# Patient Record
Sex: FEMALE | Race: WHITE | NOT HISPANIC OR LATINO | Employment: FULL TIME | URBAN - METROPOLITAN AREA
[De-identification: names, ages, dates, MRNs, and addresses within clinical notes are randomized per-mention and may not be internally consistent; named-entity substitution may affect disease eponyms.]

---

## 2023-08-23 PROBLEM — H40.1190 PRIMARY OPEN ANGLE GLAUCOMA: Status: ACTIVE | Noted: 2023-08-23

## 2023-08-23 PROBLEM — H02.834 DERMATOCHALASIS OF LEFT UPPER EYELID: Status: ACTIVE | Noted: 2023-08-23

## 2023-08-23 PROBLEM — H02.831 DERMATOCHALASIS OF RIGHT UPPER EYELID: Status: ACTIVE | Noted: 2023-08-23

## 2023-08-23 PROBLEM — E88.09 HYPERPROTEINEMIA: Status: ACTIVE | Noted: 2023-08-23

## 2023-08-23 PROBLEM — F41.9 ANXIETY: Status: ACTIVE | Noted: 2023-08-23

## 2023-08-23 PROBLEM — D72.819 LEUKOPENIA: Status: ACTIVE | Noted: 2023-08-23

## 2023-08-23 PROBLEM — H25.10 NUCLEAR SENILE CATARACT: Status: ACTIVE | Noted: 2023-08-23

## 2023-08-23 PROBLEM — I10 ESSENTIAL HYPERTENSION: Status: ACTIVE | Noted: 2023-08-23

## 2023-08-23 PROBLEM — R68.89 SUSPECTED GLAUCOMA OF BOTH EYES: Status: ACTIVE | Noted: 2023-08-23

## 2023-08-23 PROBLEM — H40.059 OCULAR HYPERTENSION: Status: ACTIVE | Noted: 2023-08-23

## 2023-08-23 RX ORDER — AMLODIPINE BESYLATE 2.5 MG/1
1 TABLET ORAL DAILY
COMMUNITY
Start: 2021-05-07 | End: 2024-02-19 | Stop reason: SDUPTHER

## 2023-08-23 RX ORDER — LYSINE HCL 500 MG
1 TABLET ORAL DAILY
COMMUNITY
End: 2024-02-19 | Stop reason: SDUPTHER

## 2023-09-28 ENCOUNTER — HOSPITAL ENCOUNTER (OUTPATIENT)
Dept: DATA CONVERSION | Facility: HOSPITAL | Age: 63
End: 2023-09-28

## 2023-09-28 DIAGNOSIS — Z12.31 ENCOUNTER FOR SCREENING MAMMOGRAM FOR MALIGNANT NEOPLASM OF BREAST: ICD-10-CM

## 2023-09-30 ENCOUNTER — ANCILLARY PROCEDURE (OUTPATIENT)
Dept: RADIOLOGY | Facility: CLINIC | Age: 63
End: 2023-09-30
Payer: COMMERCIAL

## 2023-09-30 VITALS — WEIGHT: 151 LBS | HEIGHT: 65 IN | BODY MASS INDEX: 25.16 KG/M2

## 2023-09-30 DIAGNOSIS — Z12.9 SCREENING FOR MALIGNANT NEOPLASM: ICD-10-CM

## 2023-09-30 PROCEDURE — 77067 SCR MAMMO BI INCL CAD: CPT | Mod: 50

## 2023-09-30 PROCEDURE — 77063 BREAST TOMOSYNTHESIS BI: CPT | Mod: 50

## 2024-01-30 ENCOUNTER — OFFICE VISIT (OUTPATIENT)
Dept: OPHTHALMOLOGY | Facility: CLINIC | Age: 64
End: 2024-01-30
Payer: COMMERCIAL

## 2024-01-30 DIAGNOSIS — H04.123 DRY EYES, BILATERAL: ICD-10-CM

## 2024-01-30 DIAGNOSIS — H25.813 COMBINED FORMS OF AGE-RELATED CATARACT OF BOTH EYES: ICD-10-CM

## 2024-01-30 DIAGNOSIS — H40.053 BILATERAL OCULAR HYPERTENSION: ICD-10-CM

## 2024-01-30 DIAGNOSIS — H40.053 OCULAR HYPERTENSION, BILATERAL: ICD-10-CM

## 2024-01-30 DIAGNOSIS — R68.89 SUSPECTED GLAUCOMA OF BOTH EYES: ICD-10-CM

## 2024-01-30 DIAGNOSIS — H40.003 PREGLAUCOMA, BILATERAL: Primary | ICD-10-CM

## 2024-01-30 PROCEDURE — 99214 OFFICE O/P EST MOD 30 MIN: CPT | Performed by: OPHTHALMOLOGY

## 2024-01-30 PROCEDURE — 92083 EXTENDED VISUAL FIELD XM: CPT | Performed by: OPHTHALMOLOGY

## 2024-01-30 PROCEDURE — 92133 CPTRZD OPH DX IMG PST SGM ON: CPT | Performed by: OPHTHALMOLOGY

## 2024-01-30 ASSESSMENT — ENCOUNTER SYMPTOMS
PSYCHIATRIC NEGATIVE: 0
CARDIOVASCULAR NEGATIVE: 0
ALLERGIC/IMMUNOLOGIC NEGATIVE: 0
RESPIRATORY NEGATIVE: 0
CONSTITUTIONAL NEGATIVE: 0
NEUROLOGICAL NEGATIVE: 0
HEMATOLOGIC/LYMPHATIC NEGATIVE: 0
EYES NEGATIVE: 0
GASTROINTESTINAL NEGATIVE: 0
ENDOCRINE NEGATIVE: 0
DEPRESSION: 0
LOSS OF SENSATION IN FEET: 0
MUSCULOSKELETAL NEGATIVE: 0
OCCASIONAL FEELINGS OF UNSTEADINESS: 0

## 2024-01-30 ASSESSMENT — REFRACTION_WEARINGRX
OD_CYLINDER: -1.50
SPECS_TYPE: DISTANCE ONLY
OD_AXIS: 103
OS_AXIS: 083
OS_SPHERE: +1.75
OD_SPHERE: +1.75
SPECS_TYPE: OTC READERS
OS_SPHERE: +1.00
OS_CYLINDER: -1.50
OD_SPHERE: +1.00

## 2024-01-30 ASSESSMENT — VISUAL ACUITY
OS_SC+: -2
OD_SC: 20/30
OD_SC+: +1
OS_SC: 20/30
METHOD: SNELLEN - SINGLE

## 2024-01-30 ASSESSMENT — TONOMETRY
OD_IOP_MMHG: 20
OS_IOP_MMHG: 20
IOP_METHOD: GOLDMANN APPLANATION

## 2024-01-30 ASSESSMENT — EXTERNAL EXAM - RIGHT EYE: OD_EXAM: BROW PTOSIS

## 2024-01-30 ASSESSMENT — PAIN SCALES - GENERAL: PAINLEVEL: 0-NO PAIN

## 2024-01-30 ASSESSMENT — PACHYMETRY
OD_CT(UM): 615
OS_CT(UM): 613

## 2024-01-30 ASSESSMENT — PATIENT HEALTH QUESTIONNAIRE - PHQ9
SUM OF ALL RESPONSES TO PHQ9 QUESTIONS 1 AND 2: 0
2. FEELING DOWN, DEPRESSED OR HOPELESS: NOT AT ALL
1. LITTLE INTEREST OR PLEASURE IN DOING THINGS: NOT AT ALL

## 2024-01-30 ASSESSMENT — SLIT LAMP EXAM - LIDS
COMMENTS: 1+ BLEPHARITIS, 1+ DERMATOCHALASIS - UPPER LID
COMMENTS: 1+ BLEPHARITIS, 1+ DERMATOCHALASIS - UPPER LID

## 2024-01-30 ASSESSMENT — CUP TO DISC RATIO
OD_RATIO: 0.4
OS_RATIO: 0.3

## 2024-01-30 ASSESSMENT — EXTERNAL EXAM - LEFT EYE: OS_EXAM: BROW PTOSIS

## 2024-01-30 NOTE — PROGRESS NOTES
Subjective   Patient ID: Betsy Escalante is a 64 y.o. female.    Chief Complaint    Annual Exam       HPI    No visual acuity (VA) complaints  PATIENT DENIES REFRACTION  Goes to an optom for glasses and soft contact lens (SCL) last seen 3 months ago    Last edited by RADHA Teran on 1/30/2024  1:50 PM.        No current outpatient medications on file. (Ophthalmology pharm classes)       Current Outpatient Medications (Other)   Medication Sig Dispense Refill    amLODIPine (Norvasc) 2.5 mg tablet Take 1 tablet (2.5 mg) by mouth once daily.      calcium carbonate-vit D3-min 600 mg calcium- 400 unit tablet Take 1 tablet by mouth once daily. With food         Objective   Base Eye Exam       Visual Acuity (Snellen - Single)         Right Left Both    Dist sc 20/30 +1 20/30 -2     Near cc   J1   Did not bring glasses or soft contact lens (SCL) today             Tonometry (Goldmann Applanation, 2:03 PM)         Right Left    Pressure 20 20   Holding and squeezing OU.              Pupils         Dark Shape React APD    Right 3 Round Minimal None    Left 3 Round Minimal None              Extraocular Movement         Right Left     Full Full              Dilation       Both eyes: 1% Tropic 2.5% Phen @ 2:03 PM                  Slit Lamp and Fundus Exam       External Exam         Right Left    External Brow ptosis Brow ptosis              Slit Lamp Exam         Right Left    Lids/Lashes 1+ Blepharitis, 1+ Dermatochalasis - upper lid 1+ Blepharitis, 1+ Dermatochalasis - upper lid    Conjunctiva/Sclera normal bulbar and palepbral conjunctiva normal bulbar and palepbral conjunctiva    Cornea normal epi/stroma/endo and tear film normal epi/stroma/endo and tear film    Anterior Chamber normal anterior chamber, deep and quiet normal anterior chamber, deep and quiet    Iris iris normal iris normal    Lens 1+ Nuclear sclerosis, 1+ Cortical cataract 1+ Nuclear sclerosis, 1+ Cortical cataract    Anterior Vitreous vitreous clear  and normal vitreous clear and normal              Fundus Exam         Right Left    Disc normal optic nerve normal optic nerve    C/D Ratio 0.4 0.3    Macula normal macula normal macula    Vessels normal retinal vessels normal retinal vessels    Periphery normal retinal periphery normal retinal periphery                  Refraction       Wearing Rx         Sphere Cylinder Axis    Right +1.75      Left +1.75        Type: OTC readers              Wearing Rx #2         Sphere Cylinder Axis    Right +1.00 -1.50 103    Left +1.00 -1.50 083      Type: distance only                    Assessment/Plan   Problem List Items Addressed This Visit          Eye/Vision problems    Combined forms of age-related cataract of both eyes    Ocular hypertension    Suspected glaucoma of both eyes    Preglaucoma, bilateral - Primary     F/u one year full with oct nerves.  Can do f qoyear.          Relevant Orders    [place] Visual Field - OU - Both Eyes (Completed)    Dry eyes, bilateral     Use art tears, especially with cl's.           Other Visit Diagnoses       Ocular hypertension, bilateral        Relevant Orders    OCT, Optic Nerve - OU - Both Eyes (Completed)

## 2024-02-01 ENCOUNTER — APPOINTMENT (OUTPATIENT)
Dept: PRIMARY CARE | Facility: CLINIC | Age: 64
End: 2024-02-01
Payer: COMMERCIAL

## 2024-02-14 ENCOUNTER — TELEPHONE (OUTPATIENT)
Dept: PRIMARY CARE | Facility: CLINIC | Age: 64
End: 2024-02-14
Payer: COMMERCIAL

## 2024-02-14 DIAGNOSIS — I10 ESSENTIAL HYPERTENSION: Primary | ICD-10-CM

## 2024-02-14 DIAGNOSIS — E55.9 VITAMIN D DEFICIENCY: ICD-10-CM

## 2024-02-14 DIAGNOSIS — Z01.89 ENCOUNTER FOR ROUTINE LABORATORY TESTING: ICD-10-CM

## 2024-02-16 ENCOUNTER — LAB (OUTPATIENT)
Dept: LAB | Facility: LAB | Age: 64
End: 2024-02-16
Payer: COMMERCIAL

## 2024-02-16 DIAGNOSIS — Z01.89 ENCOUNTER FOR ROUTINE LABORATORY TESTING: ICD-10-CM

## 2024-02-16 DIAGNOSIS — E55.9 VITAMIN D DEFICIENCY: ICD-10-CM

## 2024-02-16 DIAGNOSIS — I10 ESSENTIAL HYPERTENSION: ICD-10-CM

## 2024-02-16 LAB
25(OH)D3 SERPL-MCNC: 35 NG/ML (ref 31–100)
ALBUMIN SERPL-MCNC: 4.7 G/DL (ref 3.5–5)
ALP BLD-CCNC: 102 U/L (ref 35–125)
ALT SERPL-CCNC: 23 U/L (ref 5–40)
ANION GAP SERPL CALC-SCNC: 15 MMOL/L
AST SERPL-CCNC: 21 U/L (ref 5–40)
BASOPHILS # BLD AUTO: 0.04 X10*3/UL (ref 0–0.1)
BASOPHILS NFR BLD AUTO: 0.9 %
BILIRUB DIRECT SERPL-MCNC: <0.2 MG/DL (ref 0–0.2)
BILIRUB SERPL-MCNC: 0.9 MG/DL (ref 0.1–1.2)
BUN SERPL-MCNC: 13 MG/DL (ref 8–25)
CALCIUM SERPL-MCNC: 9.7 MG/DL (ref 8.5–10.4)
CHLORIDE SERPL-SCNC: 103 MMOL/L (ref 97–107)
CHOLEST SERPL-MCNC: 209 MG/DL (ref 133–200)
CHOLEST/HDLC SERPL: 3.5 {RATIO}
CO2 SERPL-SCNC: 22 MMOL/L (ref 24–31)
CREAT SERPL-MCNC: 0.8 MG/DL (ref 0.4–1.6)
EGFRCR SERPLBLD CKD-EPI 2021: 82 ML/MIN/1.73M*2
EOSINOPHIL # BLD AUTO: 0.07 X10*3/UL (ref 0–0.7)
EOSINOPHIL NFR BLD AUTO: 1.5 %
ERYTHROCYTE [DISTWIDTH] IN BLOOD BY AUTOMATED COUNT: 12.5 % (ref 11.5–14.5)
EST. AVERAGE GLUCOSE BLD GHB EST-MCNC: 105 MG/DL
GLUCOSE SERPL-MCNC: 104 MG/DL (ref 65–99)
HBA1C MFR BLD: 5.3 %
HCT VFR BLD AUTO: 45.7 % (ref 36–46)
HDLC SERPL-MCNC: 59 MG/DL
HGB BLD-MCNC: 15.2 G/DL (ref 12–16)
IMM GRANULOCYTES # BLD AUTO: 0.01 X10*3/UL (ref 0–0.7)
IMM GRANULOCYTES NFR BLD AUTO: 0.2 % (ref 0–0.9)
LDLC SERPL CALC-MCNC: 129 MG/DL (ref 65–130)
LYMPHOCYTES # BLD AUTO: 1.4 X10*3/UL (ref 1.2–4.8)
LYMPHOCYTES NFR BLD AUTO: 30.4 %
MCH RBC QN AUTO: 30.6 PG (ref 26–34)
MCHC RBC AUTO-ENTMCNC: 33.3 G/DL (ref 32–36)
MCV RBC AUTO: 92 FL (ref 80–100)
MONOCYTES # BLD AUTO: 0.47 X10*3/UL (ref 0.1–1)
MONOCYTES NFR BLD AUTO: 10.2 %
NEUTROPHILS # BLD AUTO: 2.61 X10*3/UL (ref 1.2–7.7)
NEUTROPHILS NFR BLD AUTO: 56.8 %
NRBC BLD-RTO: 0 /100 WBCS (ref 0–0)
PLATELET # BLD AUTO: 285 X10*3/UL (ref 150–450)
POTASSIUM SERPL-SCNC: 4.1 MMOL/L (ref 3.4–5.1)
PROT SERPL-MCNC: 7.7 G/DL (ref 5.9–7.9)
RBC # BLD AUTO: 4.97 X10*6/UL (ref 4–5.2)
SODIUM SERPL-SCNC: 140 MMOL/L (ref 133–145)
TRIGL SERPL-MCNC: 106 MG/DL (ref 40–150)
WBC # BLD AUTO: 4.6 X10*3/UL (ref 4.4–11.3)

## 2024-02-16 PROCEDURE — 85025 COMPLETE CBC W/AUTO DIFF WBC: CPT

## 2024-02-16 PROCEDURE — 82306 VITAMIN D 25 HYDROXY: CPT

## 2024-02-16 PROCEDURE — 36415 COLL VENOUS BLD VENIPUNCTURE: CPT

## 2024-02-16 PROCEDURE — 80053 COMPREHEN METABOLIC PANEL: CPT

## 2024-02-16 PROCEDURE — 83036 HEMOGLOBIN GLYCOSYLATED A1C: CPT

## 2024-02-16 PROCEDURE — 82248 BILIRUBIN DIRECT: CPT

## 2024-02-16 PROCEDURE — 80061 LIPID PANEL: CPT

## 2024-02-19 ENCOUNTER — OFFICE VISIT (OUTPATIENT)
Dept: PRIMARY CARE | Facility: CLINIC | Age: 64
End: 2024-02-19
Payer: COMMERCIAL

## 2024-02-19 VITALS
TEMPERATURE: 97.7 F | HEART RATE: 89 BPM | BODY MASS INDEX: 25.49 KG/M2 | WEIGHT: 153 LBS | HEIGHT: 65 IN | SYSTOLIC BLOOD PRESSURE: 138 MMHG | DIASTOLIC BLOOD PRESSURE: 78 MMHG | OXYGEN SATURATION: 99 %

## 2024-02-19 DIAGNOSIS — Z23 ENCOUNTER FOR IMMUNIZATION: ICD-10-CM

## 2024-02-19 DIAGNOSIS — Z12.11 ENCOUNTER FOR COLORECTAL CANCER SCREENING: ICD-10-CM

## 2024-02-19 DIAGNOSIS — Z12.31 ENCOUNTER FOR SCREENING MAMMOGRAM FOR BREAST CANCER: ICD-10-CM

## 2024-02-19 DIAGNOSIS — Z00.00 ANNUAL PHYSICAL EXAM: Primary | ICD-10-CM

## 2024-02-19 DIAGNOSIS — E55.9 VITAMIN D DEFICIENCY: ICD-10-CM

## 2024-02-19 DIAGNOSIS — I10 ESSENTIAL HYPERTENSION: ICD-10-CM

## 2024-02-19 DIAGNOSIS — Z12.12 ENCOUNTER FOR COLORECTAL CANCER SCREENING: ICD-10-CM

## 2024-02-19 DIAGNOSIS — Z01.89 ENCOUNTER FOR ROUTINE LABORATORY TESTING: ICD-10-CM

## 2024-02-19 DIAGNOSIS — F41.9 ANXIETY: ICD-10-CM

## 2024-02-19 PROBLEM — R68.89 SUSPECTED GLAUCOMA OF BOTH EYES: Status: RESOLVED | Noted: 2023-08-23 | Resolved: 2024-02-19

## 2024-02-19 PROCEDURE — 3075F SYST BP GE 130 - 139MM HG: CPT | Performed by: STUDENT IN AN ORGANIZED HEALTH CARE EDUCATION/TRAINING PROGRAM

## 2024-02-19 PROCEDURE — 1036F TOBACCO NON-USER: CPT | Performed by: STUDENT IN AN ORGANIZED HEALTH CARE EDUCATION/TRAINING PROGRAM

## 2024-02-19 PROCEDURE — 99396 PREV VISIT EST AGE 40-64: CPT | Performed by: STUDENT IN AN ORGANIZED HEALTH CARE EDUCATION/TRAINING PROGRAM

## 2024-02-19 PROCEDURE — 3078F DIAST BP <80 MM HG: CPT | Performed by: STUDENT IN AN ORGANIZED HEALTH CARE EDUCATION/TRAINING PROGRAM

## 2024-02-19 RX ORDER — AMLODIPINE BESYLATE 2.5 MG/1
2.5 TABLET ORAL DAILY
Start: 2024-02-19

## 2024-02-19 RX ORDER — LYSINE HCL 500 MG
1 TABLET ORAL DAILY
Start: 2024-02-19

## 2024-02-19 RX ORDER — MULTIVITAMIN
1 TABLET ORAL DAILY
Start: 2024-02-19

## 2024-02-19 ASSESSMENT — ENCOUNTER SYMPTOMS
MUSCULOSKELETAL NEGATIVE: 1
CARDIOVASCULAR NEGATIVE: 1
ALLERGIC/IMMUNOLOGIC NEGATIVE: 1
CONSTITUTIONAL NEGATIVE: 1
LOSS OF SENSATION IN FEET: 0
RESPIRATORY NEGATIVE: 1
DEPRESSION: 0
HEMATOLOGIC/LYMPHATIC NEGATIVE: 1
OCCASIONAL FEELINGS OF UNSTEADINESS: 0
ENDOCRINE NEGATIVE: 1
EYES NEGATIVE: 1
NEUROLOGICAL NEGATIVE: 1
PSYCHIATRIC NEGATIVE: 1
GASTROINTESTINAL NEGATIVE: 1

## 2024-02-19 ASSESSMENT — PATIENT HEALTH QUESTIONNAIRE - PHQ9
2. FEELING DOWN, DEPRESSED OR HOPELESS: NOT AT ALL
SUM OF ALL RESPONSES TO PHQ9 QUESTIONS 1 AND 2: 0
1. LITTLE INTEREST OR PLEASURE IN DOING THINGS: NOT AT ALL

## 2024-02-19 ASSESSMENT — LIFESTYLE VARIABLES
HOW OFTEN DURING THE LAST YEAR HAVE YOU FAILED TO DO WHAT WAS NORMALLY EXPECTED FROM YOU BECAUSE OF DRINKING: NEVER
SKIP TO QUESTIONS 9-10: 1
HOW OFTEN DURING THE LAST YEAR HAVE YOU NEEDED AN ALCOHOLIC DRINK FIRST THING IN THE MORNING TO GET YOURSELF GOING AFTER A NIGHT OF HEAVY DRINKING: NEVER
AUDIT-C TOTAL SCORE: 1
HOW OFTEN DURING THE LAST YEAR HAVE YOU BEEN UNABLE TO REMEMBER WHAT HAPPENED THE NIGHT BEFORE BECAUSE YOU HAD BEEN DRINKING: NEVER
AUDIT TOTAL SCORE: 1
HAS A RELATIVE, FRIEND, DOCTOR, OR ANOTHER HEALTH PROFESSIONAL EXPRESSED CONCERN ABOUT YOUR DRINKING OR SUGGESTED YOU CUT DOWN: NO
HAVE YOU OR SOMEONE ELSE BEEN INJURED AS A RESULT OF YOUR DRINKING: NO
HOW MANY STANDARD DRINKS CONTAINING ALCOHOL DO YOU HAVE ON A TYPICAL DAY: 1 OR 2
HOW OFTEN DURING THE LAST YEAR HAVE YOU FOUND THAT YOU WERE NOT ABLE TO STOP DRINKING ONCE YOU HAD STARTED: NEVER
HOW OFTEN DO YOU HAVE SIX OR MORE DRINKS ON ONE OCCASION: NEVER
HOW OFTEN DURING THE LAST YEAR HAVE YOU HAD A FEELING OF GUILT OR REMORSE AFTER DRINKING: NEVER
HOW OFTEN DO YOU HAVE A DRINK CONTAINING ALCOHOL: MONTHLY OR LESS

## 2024-02-19 ASSESSMENT — PAIN SCALES - GENERAL: PAINLEVEL: 0-NO PAIN

## 2024-02-19 NOTE — PROGRESS NOTES
[facility]: [facility]  PHYSICAL EXAM      Betsy Escalante is a 64 y.o. female that is presenting today for Annual Exam.    Assessment/Plan   Diagnoses and all orders for this visit:  Annual physical exam  -     Follow Up In Primary Care; Future  Encounter for routine laboratory testing  Encounter for colorectal cancer screening  Encounter for screening mammogram for breast cancer  -     BI mammo bilateral screening tomosynthesis; Future  Encounter for immunization  Essential hypertension  -     amLODIPine (Norvasc) 2.5 mg tablet; Take 1 tablet (2.5 mg) by mouth once daily.  Vitamin D deficiency  -     calcium carbonate-vit D3-min 600 mg calcium- 400 unit tablet; Take 1 tablet by mouth once daily.  -     multivitamin tablet; Take 1 tablet by mouth once daily.  Anxiety    - Significant medication and problem list reconciliation done today.  - Patient had labwork done for this appointment. Everything looked great.   - Patient will be due for mammogram 09/2024. Ordered today.  - Encouraged shingles (shingrix) immunizations. Patient otherwise not due for routine immunizations at this time.  - Patient notes that she is due for repeat colorectal cancer screening. Has upcoming appointment with Dr. Esquivel March 2024.  - Patient's blood pressure elevated on the first reading today. Patient deals with significant anxiety with being in the physician's office. She has been checking at home and the numbers look improved at home. Will not make changes at this time.    Subjective   - The patient otherwise feels well and denies any acute symptoms or concerns at this time.  - The patient denies any changes or progression of their chronic medical problems.  - The patient denies any problems or concerns with their medications.      Review of Systems   Constitutional: Negative.    HENT: Negative.     Eyes: Negative.    Respiratory: Negative.     Cardiovascular: Negative.    Gastrointestinal: Negative.     Endocrine: Negative.    Genitourinary: Negative.    Musculoskeletal: Negative.    Skin: Negative.    Allergic/Immunologic: Negative.    Neurological: Negative.    Hematological: Negative.    Psychiatric/Behavioral: Negative.     All other systems reviewed and are negative.     Objective   Vitals:    02/19/24 1049   BP: 138/78   Pulse: 89   Temp: 36.5 °C (97.7 °F)   SpO2: 99%     Body mass index is 25.46 kg/m².  Physical Exam  Vitals and nursing note reviewed.   Constitutional:       General: She is not in acute distress.     Appearance: Normal appearance. She is not ill-appearing.   HENT:      Head: Normocephalic and atraumatic.      Right Ear: Tympanic membrane, ear canal and external ear normal. There is no impacted cerumen.      Left Ear: Tympanic membrane, ear canal and external ear normal. There is no impacted cerumen.      Nose: Nose normal.      Mouth/Throat:      Mouth: Mucous membranes are moist.      Pharynx: Oropharynx is clear. No oropharyngeal exudate or posterior oropharyngeal erythema.   Eyes:      General: No scleral icterus.        Right eye: No discharge.         Left eye: No discharge.      Extraocular Movements: Extraocular movements intact.      Conjunctiva/sclera: Conjunctivae normal.      Pupils: Pupils are equal, round, and reactive to light.   Neck:      Vascular: No carotid bruit.   Cardiovascular:      Rate and Rhythm: Normal rate and regular rhythm.      Pulses: Normal pulses.      Heart sounds: Normal heart sounds. No murmur heard.     No friction rub. No gallop.   Pulmonary:      Effort: Pulmonary effort is normal. No respiratory distress.      Breath sounds: Normal breath sounds.   Abdominal:      General: Abdomen is flat. Bowel sounds are normal. There is no distension.      Palpations: Abdomen is soft.      Tenderness: There is no abdominal tenderness.      Hernia: No hernia is present.   Musculoskeletal:         General: No swelling or tenderness. Normal range of motion.  "  Lymphadenopathy:      Cervical: No cervical adenopathy.   Skin:     General: Skin is warm and dry.      Capillary Refill: Capillary refill takes less than 2 seconds.      Coloration: Skin is not jaundiced.      Findings: No rash.   Neurological:      General: No focal deficit present.      Mental Status: She is alert and oriented to person, place, and time. Mental status is at baseline.   Psychiatric:         Mood and Affect: Mood normal.         Behavior: Behavior normal.       Diagnostic Results   Lab Results   Component Value Date    GLUCOSE 104 (H) 02/16/2024    CALCIUM 9.7 02/16/2024     02/16/2024    K 4.1 02/16/2024    CO2 22 (L) 02/16/2024     02/16/2024    BUN 13 02/16/2024    CREATININE 0.80 02/16/2024     Lab Results   Component Value Date    ALT 23 02/16/2024    AST 21 02/16/2024    ALKPHOS 102 02/16/2024    BILITOT 0.9 02/16/2024     Lab Results   Component Value Date    WBC 4.6 02/16/2024    HGB 15.2 02/16/2024    HCT 45.7 02/16/2024    MCV 92 02/16/2024     02/16/2024     Lab Results   Component Value Date    CHOL 209 (H) 02/16/2024    CHOL 203 (H) 11/10/2022    CHOL 186 04/28/2021     Lab Results   Component Value Date    HDL 59.0 02/16/2024    HDL 53 11/10/2022    HDL 53 04/28/2021     Lab Results   Component Value Date    LDLCALC 129 02/16/2024    LDLCALC 129 11/10/2022    LDLCALC 107 04/28/2021     Lab Results   Component Value Date    TRIG 106 02/16/2024    TRIG 103 11/10/2022    TRIG 129 04/28/2021     No components found for: \"CHOLHDL\"  Lab Results   Component Value Date    HGBA1C 5.3 02/16/2024     Other labs not included in the list above were reviewed either before or during this encounter.    History   Past Medical History:   Diagnosis Date    Age-related nuclear cataract of both eyes     Blepharitis of upper eyelids of both eyes     Dermatochalasis of both upper eyelids     Ocular hypertension, bilateral     Preglaucoma, bilateral      Past Surgical History: "   Procedure Laterality Date    BREAST BIOPSY Left      Family History   Problem Relation Name Age of Onset    Other (sepsis) Mother      Heart attack Father      Hypertension Father      Heart disease Father      Diabetes Brother      Diabetes Other FAMILY HX     Heart disease Other FAMILY HX     Other (high blood pressure) Other FAMILY HX      Social History     Socioeconomic History    Marital status:      Spouse name: Not on file    Number of children: Not on file    Years of education: Not on file    Highest education level: Not on file   Occupational History    Not on file   Tobacco Use    Smoking status: Never    Smokeless tobacco: Never   Vaping Use    Vaping Use: Never used   Substance and Sexual Activity    Alcohol use: Yes    Drug use: Not Currently    Sexual activity: Not on file   Other Topics Concern    Not on file   Social History Narrative    Not on file     Social Determinants of Health     Financial Resource Strain: Not on file   Food Insecurity: Not on file   Transportation Needs: Not on file   Physical Activity: Not on file   Stress: Not on file   Social Connections: Not on file   Intimate Partner Violence: Not on file   Housing Stability: Not on file     No Known Allergies  Current Outpatient Medications on File Prior to Visit   Medication Sig Dispense Refill    [DISCONTINUED] amLODIPine (Norvasc) 2.5 mg tablet Take 1 tablet (2.5 mg) by mouth once daily.      [DISCONTINUED] calcium carbonate-vit D3-min 600 mg calcium- 400 unit tablet Take 1 tablet by mouth once daily. With food       No current facility-administered medications on file prior to visit.     Immunization History   Administered Date(s) Administered    Hepatitis B vaccine, pediatric/adolescent (RECOMBIVAX, ENGERIX) 08/13/2009, 09/14/2009    Moderna SARS-CoV-2 Vaccination 04/07/2021, 05/07/2021    Tdap vaccine, age 7 year and older (BOOSTRIX, ADACEL) 08/13/2009, 10/12/2018     Patient's medical history was reviewed and updated  either before or during this encounter.       Evens Mcclelland MD

## 2024-05-07 ENCOUNTER — LAB REQUISITION (OUTPATIENT)
Dept: LAB | Facility: HOSPITAL | Age: 64
End: 2024-05-07
Payer: COMMERCIAL

## 2024-05-07 DIAGNOSIS — N39.0 URINARY TRACT INFECTION, SITE NOT SPECIFIED: ICD-10-CM

## 2024-05-07 PROCEDURE — 87186 SC STD MICRODIL/AGAR DIL: CPT

## 2024-05-07 PROCEDURE — 87086 URINE CULTURE/COLONY COUNT: CPT

## 2024-05-10 LAB — BACTERIA UR CULT: ABNORMAL

## 2024-06-14 DIAGNOSIS — I10 ESSENTIAL HYPERTENSION: ICD-10-CM

## 2024-06-17 RX ORDER — AMLODIPINE BESYLATE 2.5 MG/1
2.5 TABLET ORAL DAILY
Qty: 90 TABLET | Refills: 3 | Status: SHIPPED | OUTPATIENT
Start: 2024-06-17

## 2024-08-26 ENCOUNTER — APPOINTMENT (OUTPATIENT)
Dept: PRIMARY CARE | Facility: CLINIC | Age: 64
End: 2024-08-26
Payer: COMMERCIAL

## 2024-09-05 ASSESSMENT — ENCOUNTER SYMPTOMS
GASTROINTESTINAL NEGATIVE: 1
RESPIRATORY NEGATIVE: 1
CONSTITUTIONAL NEGATIVE: 1
CARDIOVASCULAR NEGATIVE: 1

## 2024-09-05 NOTE — PROGRESS NOTES
[facility]: [facility]  PROGRESS NOTE      Betsy Escalante is a 64 y.o. female that is presenting today for Follow-up (6 mos fu).    Assessment/Plan   - Significant medication and problem list reconciliation done today.  - Patient did not require labwork for this appointment.  - Will order labwork for the patient's next appointment. Encouraged the patient to get this labwork done one week prior to the next appointment.  - Blood pressure at goal today. Patient also takes her blood pressures at home once/month and everything looks great.  - Encouraged continued diet, exercise, and lifestyle modification.  - Overall, the patient feels well. Denies any symptoms or concerns at this time.    Diagnoses and all orders for this visit:  Primary hypertension  -     CBC and Auto Differential; Future  -     Basic Metabolic Panel; Future  -     TSH with reflex to Free T4 if abnormal; Future  Vitamin D deficiency  -     Vitamin D 25-Hydroxy,Total (for eval of Vitamin D levels); Future  Anxiety  Encounter for routine laboratory testing  Annual physical exam  -     Follow Up In Primary Care  -     Follow Up In Primary Care; Future  Mixed hyperlipidemia  -     Hepatic Function Panel; Future  -     Lipid Panel; Future  Hyperglycemia  -     Hemoglobin A1C; Future    Current Outpatient Medications   Medication Instructions    amLODIPine (NORVASC) 2.5 mg, oral, Daily    calcium carbonate-vit D3-min 600 mg calcium- 400 unit tablet 1 tablet, oral, Daily    multivitamin tablet 1 tablet, oral, Daily     Subjective   - The patient otherwise feels well and denies any acute symptoms or concerns at this time.  - The patient denies any changes or progression of their chronic medical problems.  - The patient denies any problems or concerns with their medications.      Review of Systems   Constitutional: Negative.    Respiratory: Negative.     Cardiovascular: Negative.    Gastrointestinal: Negative.    All other systems  "reviewed and are negative.     Objective   Vitals:    09/06/24 0936   BP: 130/78   Pulse: 88   Temp: 36.2 °C (97.2 °F)   SpO2: 99%      Body mass index is 25.63 kg/m².  Physical Exam  Vitals and nursing note reviewed.   Constitutional:       General: She is not in acute distress.  Neck:      Vascular: No carotid bruit.   Cardiovascular:      Rate and Rhythm: Normal rate and regular rhythm.      Heart sounds: Normal heart sounds.   Pulmonary:      Effort: Pulmonary effort is normal.      Breath sounds: Normal breath sounds.   Musculoskeletal:         General: No swelling.   Neurological:      Mental Status: She is alert. Mental status is at baseline.   Psychiatric:         Mood and Affect: Mood normal.       Diagnostic Results   Lab Results   Component Value Date    GLUCOSE 104 (H) 02/16/2024    CALCIUM 9.7 02/16/2024     02/16/2024    K 4.1 02/16/2024    CO2 22 (L) 02/16/2024     02/16/2024    BUN 13 02/16/2024    CREATININE 0.80 02/16/2024     Lab Results   Component Value Date    ALT 23 02/16/2024    AST 21 02/16/2024    ALKPHOS 102 02/16/2024    BILITOT 0.9 02/16/2024     Lab Results   Component Value Date    WBC 4.6 02/16/2024    HGB 15.2 02/16/2024    HCT 45.7 02/16/2024    MCV 92 02/16/2024     02/16/2024     Lab Results   Component Value Date    CHOL 209 (H) 02/16/2024    CHOL 203 (H) 11/10/2022    CHOL 186 04/28/2021     Lab Results   Component Value Date    HDL 59.0 02/16/2024    HDL 53 11/10/2022    HDL 53 04/28/2021     Lab Results   Component Value Date    LDLCALC 129 02/16/2024    LDLCALC 129 11/10/2022    LDLCALC 107 04/28/2021     Lab Results   Component Value Date    TRIG 106 02/16/2024    TRIG 103 11/10/2022    TRIG 129 04/28/2021     No components found for: \"CHOLHDL\"  Lab Results   Component Value Date    HGBA1C 5.3 02/16/2024     Other labs not included in the list above were reviewed either before or during this encounter.    History    Past Medical History:   Diagnosis Date "    Age-related nuclear cataract of both eyes     Blepharitis of upper eyelids of both eyes     Dermatochalasis of both upper eyelids     Ocular hypertension, bilateral     Preglaucoma, bilateral      Past Surgical History:   Procedure Laterality Date    BREAST BIOPSY Left      Family History   Problem Relation Name Age of Onset    Other (sepsis) Mother      Heart attack Father      Hypertension Father      Heart disease Father      Diabetes Brother      Diabetes Other FAMILY HX     Heart disease Other FAMILY HX     Other (high blood pressure) Other FAMILY HX      Social History     Socioeconomic History    Marital status:      Spouse name: Not on file    Number of children: Not on file    Years of education: Not on file    Highest education level: Not on file   Occupational History    Not on file   Tobacco Use    Smoking status: Never     Passive exposure: Never    Smokeless tobacco: Never   Vaping Use    Vaping status: Never Used   Substance and Sexual Activity    Alcohol use: Yes    Drug use: Not Currently    Sexual activity: Not on file   Other Topics Concern    Not on file   Social History Narrative    Not on file     Social Determinants of Health     Financial Resource Strain: Not on file   Food Insecurity: Not on file   Transportation Needs: Not on file   Physical Activity: Not on file   Stress: Not on file   Social Connections: Not on file   Intimate Partner Violence: Not on file   Housing Stability: Not on file     No Known Allergies  Current Outpatient Medications on File Prior to Visit   Medication Sig Dispense Refill    amLODIPine (Norvasc) 2.5 mg tablet TAKE 1 TABLET BY MOUTH EVERY DAY 90 tablet 3    calcium carbonate-vit D3-min 600 mg calcium- 400 unit tablet Take 1 tablet by mouth once daily.      multivitamin tablet Take 1 tablet by mouth once daily.       No current facility-administered medications on file prior to visit.     Immunization History   Administered Date(s) Administered     Hepatitis B vaccine, 19 yrs and under (RECOMBIVAX, ENGERIX) 08/13/2009, 09/14/2009    Moderna SARS-CoV-2 Vaccination 04/07/2021, 05/07/2021    Tdap vaccine, age 7 year and older (BOOSTRIX, ADACEL) 08/13/2009, 10/12/2018     Patient's medical history was reviewed and updated either before or during this encounter.       Evens Mcclelland MD

## 2024-09-06 ENCOUNTER — OFFICE VISIT (OUTPATIENT)
Dept: PRIMARY CARE | Facility: CLINIC | Age: 64
End: 2024-09-06
Payer: COMMERCIAL

## 2024-09-06 VITALS
HEIGHT: 65 IN | DIASTOLIC BLOOD PRESSURE: 78 MMHG | BODY MASS INDEX: 25.66 KG/M2 | TEMPERATURE: 97.2 F | HEART RATE: 88 BPM | SYSTOLIC BLOOD PRESSURE: 130 MMHG | OXYGEN SATURATION: 99 % | WEIGHT: 154 LBS

## 2024-09-06 DIAGNOSIS — Z01.89 ENCOUNTER FOR ROUTINE LABORATORY TESTING: ICD-10-CM

## 2024-09-06 DIAGNOSIS — R73.9 HYPERGLYCEMIA: ICD-10-CM

## 2024-09-06 DIAGNOSIS — I10 PRIMARY HYPERTENSION: Primary | ICD-10-CM

## 2024-09-06 DIAGNOSIS — E78.2 MIXED HYPERLIPIDEMIA: ICD-10-CM

## 2024-09-06 DIAGNOSIS — F41.9 ANXIETY: ICD-10-CM

## 2024-09-06 DIAGNOSIS — Z00.00 ANNUAL PHYSICAL EXAM: ICD-10-CM

## 2024-09-06 DIAGNOSIS — E55.9 VITAMIN D DEFICIENCY: ICD-10-CM

## 2024-09-06 PROCEDURE — 99214 OFFICE O/P EST MOD 30 MIN: CPT | Performed by: STUDENT IN AN ORGANIZED HEALTH CARE EDUCATION/TRAINING PROGRAM

## 2024-09-06 PROCEDURE — 3008F BODY MASS INDEX DOCD: CPT | Performed by: STUDENT IN AN ORGANIZED HEALTH CARE EDUCATION/TRAINING PROGRAM

## 2024-09-06 PROCEDURE — 3075F SYST BP GE 130 - 139MM HG: CPT | Performed by: STUDENT IN AN ORGANIZED HEALTH CARE EDUCATION/TRAINING PROGRAM

## 2024-09-06 PROCEDURE — 3078F DIAST BP <80 MM HG: CPT | Performed by: STUDENT IN AN ORGANIZED HEALTH CARE EDUCATION/TRAINING PROGRAM

## 2024-09-06 PROCEDURE — 1036F TOBACCO NON-USER: CPT | Performed by: STUDENT IN AN ORGANIZED HEALTH CARE EDUCATION/TRAINING PROGRAM

## 2024-09-06 ASSESSMENT — PATIENT HEALTH QUESTIONNAIRE - PHQ9
1. LITTLE INTEREST OR PLEASURE IN DOING THINGS: NOT AT ALL
2. FEELING DOWN, DEPRESSED OR HOPELESS: NOT AT ALL
SUM OF ALL RESPONSES TO PHQ9 QUESTIONS 1 AND 2: 0

## 2024-09-06 ASSESSMENT — PAIN SCALES - GENERAL: PAINLEVEL: 0-NO PAIN

## 2024-09-06 NOTE — PATIENT INSTRUCTIONS
- Significant medication and problem list reconciliation done today.  - Patient did not require labwork for this appointment.  - Will order labwork for the patient's next appointment. Encouraged the patient to get this labwork done one week prior to the next appointment.  - Blood pressure at goal today. Patient also takes her blood pressures at home once/month and everything looks great.  - Encouraged continued diet, exercise, and lifestyle modification.  - Overall, the patient feels well. Denies any symptoms or concerns at this time.

## 2024-10-18 ENCOUNTER — OFFICE VISIT (OUTPATIENT)
Dept: PRIMARY CARE | Facility: CLINIC | Age: 64
End: 2024-10-18
Payer: COMMERCIAL

## 2024-10-18 VITALS
TEMPERATURE: 98.1 F | BODY MASS INDEX: 24.83 KG/M2 | OXYGEN SATURATION: 99 % | SYSTOLIC BLOOD PRESSURE: 153 MMHG | DIASTOLIC BLOOD PRESSURE: 81 MMHG | WEIGHT: 149 LBS | HEART RATE: 121 BPM | HEIGHT: 65 IN

## 2024-10-18 DIAGNOSIS — R82.90 ABNORMAL URINALYSIS: ICD-10-CM

## 2024-10-18 DIAGNOSIS — R39.9 UTI SYMPTOMS: Primary | ICD-10-CM

## 2024-10-18 LAB
POC APPEARANCE, URINE: ABNORMAL
POC BILIRUBIN, URINE: NEGATIVE
POC BLOOD, URINE: ABNORMAL
POC COLOR, URINE: YELLOW
POC GLUCOSE, URINE: NEGATIVE MG/DL
POC KETONES, URINE: NEGATIVE MG/DL
POC LEUKOCYTES, URINE: ABNORMAL
POC NITRITE,URINE: POSITIVE
POC PH, URINE: 6 PH
POC PROTEIN, URINE: NEGATIVE MG/DL
POC SPECIFIC GRAVITY, URINE: 1.01
POC UROBILINOGEN, URINE: 0.2 EU/DL

## 2024-10-18 PROCEDURE — 87186 SC STD MICRODIL/AGAR DIL: CPT

## 2024-10-18 PROCEDURE — 81003 URINALYSIS AUTO W/O SCOPE: CPT | Performed by: FAMILY MEDICINE

## 2024-10-18 PROCEDURE — 3077F SYST BP >= 140 MM HG: CPT | Performed by: FAMILY MEDICINE

## 2024-10-18 PROCEDURE — 3008F BODY MASS INDEX DOCD: CPT | Performed by: FAMILY MEDICINE

## 2024-10-18 PROCEDURE — 3079F DIAST BP 80-89 MM HG: CPT | Performed by: FAMILY MEDICINE

## 2024-10-18 PROCEDURE — 87086 URINE CULTURE/COLONY COUNT: CPT

## 2024-10-18 PROCEDURE — 1036F TOBACCO NON-USER: CPT | Performed by: FAMILY MEDICINE

## 2024-10-18 PROCEDURE — 99213 OFFICE O/P EST LOW 20 MIN: CPT | Performed by: FAMILY MEDICINE

## 2024-10-18 RX ORDER — SULFAMETHOXAZOLE AND TRIMETHOPRIM 800; 160 MG/1; MG/1
1 TABLET ORAL 2 TIMES DAILY
Qty: 6 TABLET | Refills: 0 | Status: SHIPPED | OUTPATIENT
Start: 2024-10-18 | End: 2024-10-21

## 2024-10-18 ASSESSMENT — PATIENT HEALTH QUESTIONNAIRE - PHQ9
2. FEELING DOWN, DEPRESSED OR HOPELESS: NOT AT ALL
1. LITTLE INTEREST OR PLEASURE IN DOING THINGS: NOT AT ALL
SUM OF ALL RESPONSES TO PHQ9 QUESTIONS 1 AND 2: 0

## 2024-10-18 ASSESSMENT — ENCOUNTER SYMPTOMS
FREQUENCY: 1
FEVER: 0
VOMITING: 0
ABDOMINAL PAIN: 1
CHILLS: 0
NAUSEA: 0
BACK PAIN: 0
DYSURIA: 1

## 2024-10-18 ASSESSMENT — COLUMBIA-SUICIDE SEVERITY RATING SCALE - C-SSRS
6. HAVE YOU EVER DONE ANYTHING, STARTED TO DO ANYTHING, OR PREPARED TO DO ANYTHING TO END YOUR LIFE?: NO
2. HAVE YOU ACTUALLY HAD ANY THOUGHTS OF KILLING YOURSELF?: NO
1. IN THE PAST MONTH, HAVE YOU WISHED YOU WERE DEAD OR WISHED YOU COULD GO TO SLEEP AND NOT WAKE UP?: NO

## 2024-10-18 NOTE — PROGRESS NOTES
"Subjective   Patient ID: Betsy Escalante is a 64 y.o. female who presents for Urinary Problem (Pt has cloudy urine, frequncy x2 days only has pressure and burning when urinating in the morning ).    Acute urinary symptoms: began 3 days ago, admits to cloudy urine, urinary frequency, suprapubic pressure, and dysuria, denies fever, chills, lower back pain, nausea, and emesis, has not taken anything for the symptoms.    Review of Systems   Constitutional:  Negative for chills and fever.   Gastrointestinal:  Positive for abdominal pain (suprapubic pressure). Negative for nausea and vomiting.   Genitourinary:  Positive for dysuria and frequency.        Positive for cloudy urine.   Musculoskeletal:  Negative for back pain.     Objective   /81 (BP Location: Left arm, Patient Position: Sitting, BP Cuff Size: Adult)   Pulse (!) 121   Temp 36.7 °C (98.1 °F) (Temporal)   Ht 1.651 m (5' 5\")   Wt 67.6 kg (149 lb)   SpO2 99%   BMI 24.79 kg/m²     Physical Exam  Vitals and nursing note reviewed.   Constitutional:       Appearance: Normal appearance. She is normal weight.   Cardiovascular:      Rate and Rhythm: Normal rate and regular rhythm.      Heart sounds: Normal heart sounds.   Pulmonary:      Effort: Pulmonary effort is normal.      Breath sounds: Normal breath sounds.   Abdominal:      General: Bowel sounds are normal.      Palpations: Abdomen is soft.      Tenderness: There is no abdominal tenderness. There is no right CVA tenderness, left CVA tenderness or guarding.   Neurological:      Mental Status: She is alert.     Assessment/Plan   Diagnoses and all orders for this visit:  UTI symptoms  New.  POCT UA Automated with abnormal results.  Sulfamethoxazole-trimethoprim (Bactrim DS) 800-160 mg tablet prescribed.  Adequate hydration.  Void often.  Follow up with PCP in 5-7 days if not better.  Abnormal urinalysis  New.  Urine Culture ordered.  Sulfamethoxazole-trimethoprim (Bactrim DS) 800-160 mg tablet " prescribed.

## 2024-10-20 LAB — BACTERIA UR CULT: ABNORMAL

## 2024-10-21 LAB — BACTERIA UR CULT: ABNORMAL

## 2024-10-24 ENCOUNTER — HOSPITAL ENCOUNTER (OUTPATIENT)
Dept: RADIOLOGY | Facility: CLINIC | Age: 64
Discharge: HOME | End: 2024-10-24
Payer: COMMERCIAL

## 2024-10-24 VITALS — WEIGHT: 158 LBS | HEIGHT: 65 IN | BODY MASS INDEX: 26.33 KG/M2

## 2024-10-24 DIAGNOSIS — Z12.31 ENCOUNTER FOR SCREENING MAMMOGRAM FOR BREAST CANCER: ICD-10-CM

## 2024-10-24 PROCEDURE — 77067 SCR MAMMO BI INCL CAD: CPT

## 2025-02-10 ENCOUNTER — APPOINTMENT (OUTPATIENT)
Dept: OPHTHALMOLOGY | Facility: CLINIC | Age: 65
End: 2025-02-10
Payer: COMMERCIAL

## 2025-02-13 ENCOUNTER — APPOINTMENT (OUTPATIENT)
Dept: OPHTHALMOLOGY | Facility: CLINIC | Age: 65
End: 2025-02-13
Payer: COMMERCIAL

## 2025-02-13 DIAGNOSIS — H40.1190 PRIMARY OPEN ANGLE GLAUCOMA, UNSPECIFIED GLAUCOMA STAGE, UNSPECIFIED LATERALITY: Primary | ICD-10-CM

## 2025-02-13 DIAGNOSIS — H40.003 PREGLAUCOMA, BILATERAL: ICD-10-CM

## 2025-02-13 DIAGNOSIS — H40.053 BILATERAL OCULAR HYPERTENSION: ICD-10-CM

## 2025-02-13 DIAGNOSIS — H04.123 DRY EYES, BILATERAL: ICD-10-CM

## 2025-02-13 DIAGNOSIS — H25.813 COMBINED FORMS OF AGE-RELATED CATARACT OF BOTH EYES: ICD-10-CM

## 2025-02-13 DIAGNOSIS — H02.831 DERMATOCHALASIS OF BOTH UPPER EYELIDS: ICD-10-CM

## 2025-02-13 DIAGNOSIS — H02.834 DERMATOCHALASIS OF BOTH UPPER EYELIDS: ICD-10-CM

## 2025-02-13 PROCEDURE — 99214 OFFICE O/P EST MOD 30 MIN: CPT | Performed by: OPHTHALMOLOGY

## 2025-02-13 PROCEDURE — 92133 CPTRZD OPH DX IMG PST SGM ON: CPT | Performed by: OPHTHALMOLOGY

## 2025-02-13 ASSESSMENT — EXTERNAL EXAM - RIGHT EYE: OD_EXAM: BROW PTOSIS

## 2025-02-13 ASSESSMENT — ENCOUNTER SYMPTOMS
CONSTITUTIONAL NEGATIVE: 0
RESPIRATORY NEGATIVE: 0
ENDOCRINE NEGATIVE: 0
PSYCHIATRIC NEGATIVE: 0
EYES NEGATIVE: 0
NEUROLOGICAL NEGATIVE: 0
ALLERGIC/IMMUNOLOGIC NEGATIVE: 0
MUSCULOSKELETAL NEGATIVE: 0
CARDIOVASCULAR NEGATIVE: 0
HEMATOLOGIC/LYMPHATIC NEGATIVE: 0
GASTROINTESTINAL NEGATIVE: 0

## 2025-02-13 ASSESSMENT — VISUAL ACUITY
METHOD: SNELLEN - LINEAR
OD_SC+: +1
OD_SC: 20/30
OS_SC: 20/25
OS_SC+: -2

## 2025-02-13 ASSESSMENT — EXTERNAL EXAM - LEFT EYE: OS_EXAM: BROW PTOSIS

## 2025-02-13 ASSESSMENT — TONOMETRY
IOP_METHOD: GOLDMANN APPLANATION
OD_IOP_MMHG: 19
OS_IOP_MMHG: 20

## 2025-02-13 ASSESSMENT — PACHYMETRY
OD_CT(UM): 615
OS_CT(UM): 613

## 2025-02-13 ASSESSMENT — CUP TO DISC RATIO
OS_RATIO: 0.3
OD_RATIO: 0.4

## 2025-02-13 ASSESSMENT — PAIN SCALES - GENERAL: PAINLEVEL_OUTOF10: 0-NO PAIN

## 2025-02-13 NOTE — PROGRESS NOTES
Subjective   Patient ID: Betsy Escalante is a 65 y.o. female.    Chief Complaint    Annual Exam       HPI    PT GOES ELSEWHERE FOR SPECS AND SCLs EXAM.     Complete exam.  No changes in health history or meds.  Vision is good and stable.  No problems or complaints.    Last edited by John Up MD on 2/13/2025  9:20 AM.        No current outpatient medications on file. (Ophthalmology pharm classes)       Current Outpatient Medications (Other)   Medication Sig Dispense Refill    amLODIPine (Norvasc) 2.5 mg tablet TAKE 1 TABLET BY MOUTH EVERY DAY 90 tablet 3    calcium carbonate-vit D3-min 600 mg calcium- 400 unit tablet Take 1 tablet by mouth once daily.      multivitamin tablet Take 1 tablet by mouth once daily.         Objective   Base Eye Exam       Visual Acuity (Snellen - Linear)         Right Left Both    Dist sc 20/30 +1 20/25 -2     Near sc   J2              Tonometry (Goldmann Applanation, 8:44 AM)         Right Left    Pressure 19 20   Holding and squeezing OU.               Pupils         Dark Shape React APD    Right 4 Round 2 None    Left 4 Round 2 None              Extraocular Movement         Right Left     Full Full              Dilation       Both eyes: 1% Tropic 2.5% Phen @ 8:42 AM                  Slit Lamp and Fundus Exam       External Exam         Right Left    External Brow ptosis Brow ptosis              Slit Lamp Exam         Right Left    Lids/Lashes 1+ Blepharitis, 1+ Dermatochalasis - upper lid 1+ Blepharitis, 1+ Dermatochalasis - upper lid    Conjunctiva/Sclera White and quiet White and quiet    Cornea Clear Clear    Anterior Chamber Deep and quiet Deep and quiet    Iris Round and reactive Round and reactive    Lens 1+ Nuclear sclerosis, 1+ Cortical cataract 1+ Nuclear sclerosis, 1+ Cortical cataract    Anterior Vitreous Normal Normal              Fundus Exam         Right Left    Disc normal optic nerve normal optic nerve    C/D Ratio 0.4 0.3    Macula normal macula normal macula     Vessels normal retinal vessels normal retinal vessels    Periphery normal retinal periphery normal retinal periphery                  Refraction       Wearing Rx       Type: none                    Assessment/Plan   Problem List Items Addressed This Visit          Eye/Vision problems    Dermatochalasis of both upper eyelids    Combined forms of age-related cataract of both eyes    Ocular hypertension     F/u one year full with oct nerves.           Primary open angle glaucoma - Primary    Relevant Orders    OCT, Optic Nerve - OU - Both Eyes (Completed)    Preglaucoma, bilateral    Dry eyes, bilateral

## 2025-03-11 LAB
25(OH)D3+25(OH)D2 SERPL-MCNC: 52 NG/ML (ref 30–100)
ALBUMIN SERPL-MCNC: 4.6 G/DL (ref 3.6–5.1)
ALBUMIN/GLOB SERPL: 1.4 (CALC) (ref 1–2.5)
ALP SERPL-CCNC: 83 U/L (ref 37–153)
ALT SERPL-CCNC: 16 U/L (ref 6–29)
ANION GAP SERPL CALCULATED.4IONS-SCNC: 14 MMOL/L (CALC) (ref 7–17)
AST SERPL-CCNC: 16 U/L (ref 10–35)
BASOPHILS # BLD AUTO: 18 CELLS/UL (ref 0–200)
BASOPHILS NFR BLD AUTO: 0.4 %
BILIRUB DIRECT SERPL-MCNC: 0.2 MG/DL
BILIRUB INDIRECT SERPL-MCNC: 0.8 MG/DL (CALC) (ref 0.2–1.2)
BILIRUB SERPL-MCNC: 1 MG/DL (ref 0.2–1.2)
BUN SERPL-MCNC: 13 MG/DL (ref 7–25)
BUN/CREAT SERPL: NORMAL (CALC) (ref 6–22)
CALCIUM SERPL-MCNC: 9.8 MG/DL (ref 8.6–10.4)
CHLORIDE SERPL-SCNC: 103 MMOL/L (ref 98–110)
CHOLEST SERPL-MCNC: 202 MG/DL
CHOLEST/HDLC SERPL: 3.4 (CALC)
CO2 SERPL-SCNC: 23 MMOL/L (ref 20–32)
CREAT SERPL-MCNC: 0.7 MG/DL (ref 0.5–1.05)
EGFRCR SERPLBLD CKD-EPI 2021: 96 ML/MIN/1.73M2
EOSINOPHIL # BLD AUTO: 90 CELLS/UL (ref 15–500)
EOSINOPHIL NFR BLD AUTO: 2 %
ERYTHROCYTE [DISTWIDTH] IN BLOOD BY AUTOMATED COUNT: 12 % (ref 11–15)
EST. AVERAGE GLUCOSE BLD GHB EST-MCNC: 108 MG/DL
EST. AVERAGE GLUCOSE BLD GHB EST-SCNC: 6 MMOL/L
GLOBULIN SER CALC-MCNC: 3.2 G/DL (CALC) (ref 1.9–3.7)
GLUCOSE SERPL-MCNC: 96 MG/DL (ref 65–99)
HBA1C MFR BLD: 5.4 % OF TOTAL HGB
HCT VFR BLD AUTO: 45.8 % (ref 35–45)
HDLC SERPL-MCNC: 60 MG/DL
HGB BLD-MCNC: 15.1 G/DL (ref 11.7–15.5)
LDLC SERPL CALC-MCNC: 120 MG/DL (CALC)
LYMPHOCYTES # BLD AUTO: 1625 CELLS/UL (ref 850–3900)
LYMPHOCYTES NFR BLD AUTO: 36.1 %
MCH RBC QN AUTO: 30.8 PG (ref 27–33)
MCHC RBC AUTO-ENTMCNC: 33 G/DL (ref 32–36)
MCV RBC AUTO: 93.3 FL (ref 80–100)
MONOCYTES # BLD AUTO: 387 CELLS/UL (ref 200–950)
MONOCYTES NFR BLD AUTO: 8.6 %
NEUTROPHILS # BLD AUTO: 2381 CELLS/UL (ref 1500–7800)
NEUTROPHILS NFR BLD AUTO: 52.9 %
NONHDLC SERPL-MCNC: 142 MG/DL (CALC)
PLATELET # BLD AUTO: 297 THOUSAND/UL (ref 140–400)
PMV BLD REES-ECKER: 10.7 FL (ref 7.5–12.5)
POTASSIUM SERPL-SCNC: 3.9 MMOL/L (ref 3.5–5.3)
PROT SERPL-MCNC: 7.8 G/DL (ref 6.1–8.1)
RBC # BLD AUTO: 4.91 MILLION/UL (ref 3.8–5.1)
SODIUM SERPL-SCNC: 140 MMOL/L (ref 135–146)
TRIGL SERPL-MCNC: 111 MG/DL
TSH SERPL-ACNC: 2.65 MIU/L (ref 0.4–4.5)
WBC # BLD AUTO: 4.5 THOUSAND/UL (ref 3.8–10.8)

## 2025-03-14 ENCOUNTER — OFFICE VISIT (OUTPATIENT)
Dept: PRIMARY CARE | Facility: CLINIC | Age: 65
End: 2025-03-14
Payer: COMMERCIAL

## 2025-03-14 VITALS
WEIGHT: 148 LBS | BODY MASS INDEX: 24.66 KG/M2 | HEART RATE: 64 BPM | DIASTOLIC BLOOD PRESSURE: 88 MMHG | HEIGHT: 65 IN | OXYGEN SATURATION: 97 % | SYSTOLIC BLOOD PRESSURE: 138 MMHG

## 2025-03-14 DIAGNOSIS — R73.9 HYPERGLYCEMIA: ICD-10-CM

## 2025-03-14 DIAGNOSIS — F41.9 ANXIETY: ICD-10-CM

## 2025-03-14 DIAGNOSIS — Z12.31 ENCOUNTER FOR SCREENING MAMMOGRAM FOR BREAST CANCER: ICD-10-CM

## 2025-03-14 DIAGNOSIS — Z01.89 ENCOUNTER FOR ROUTINE LABORATORY TESTING: ICD-10-CM

## 2025-03-14 DIAGNOSIS — Z13.820 ENCOUNTER FOR SCREENING FOR OSTEOPOROSIS: ICD-10-CM

## 2025-03-14 DIAGNOSIS — E78.2 MIXED HYPERLIPIDEMIA: ICD-10-CM

## 2025-03-14 DIAGNOSIS — I10 PRIMARY HYPERTENSION: ICD-10-CM

## 2025-03-14 DIAGNOSIS — E55.9 VITAMIN D DEFICIENCY: ICD-10-CM

## 2025-03-14 DIAGNOSIS — Z00.00 ANNUAL PHYSICAL EXAM: Primary | ICD-10-CM

## 2025-03-14 DIAGNOSIS — Z78.0 MENOPAUSE: ICD-10-CM

## 2025-03-14 DIAGNOSIS — Z23 ENCOUNTER FOR IMMUNIZATION: ICD-10-CM

## 2025-03-14 DIAGNOSIS — Z12.12 ENCOUNTER FOR COLORECTAL CANCER SCREENING: ICD-10-CM

## 2025-03-14 DIAGNOSIS — Z12.11 ENCOUNTER FOR COLORECTAL CANCER SCREENING: ICD-10-CM

## 2025-03-14 PROBLEM — E88.09 HYPERPROTEINEMIA: Status: RESOLVED | Noted: 2023-08-23 | Resolved: 2025-03-14

## 2025-03-14 PROBLEM — E78.5 HLD (HYPERLIPIDEMIA): Status: ACTIVE | Noted: 2025-03-14

## 2025-03-14 PROCEDURE — 3075F SYST BP GE 130 - 139MM HG: CPT | Performed by: STUDENT IN AN ORGANIZED HEALTH CARE EDUCATION/TRAINING PROGRAM

## 2025-03-14 PROCEDURE — 3079F DIAST BP 80-89 MM HG: CPT | Performed by: STUDENT IN AN ORGANIZED HEALTH CARE EDUCATION/TRAINING PROGRAM

## 2025-03-14 PROCEDURE — 90471 IMMUNIZATION ADMIN: CPT | Performed by: STUDENT IN AN ORGANIZED HEALTH CARE EDUCATION/TRAINING PROGRAM

## 2025-03-14 PROCEDURE — 1036F TOBACCO NON-USER: CPT | Performed by: STUDENT IN AN ORGANIZED HEALTH CARE EDUCATION/TRAINING PROGRAM

## 2025-03-14 PROCEDURE — 1159F MED LIST DOCD IN RCRD: CPT | Performed by: STUDENT IN AN ORGANIZED HEALTH CARE EDUCATION/TRAINING PROGRAM

## 2025-03-14 PROCEDURE — 1126F AMNT PAIN NOTED NONE PRSNT: CPT | Performed by: STUDENT IN AN ORGANIZED HEALTH CARE EDUCATION/TRAINING PROGRAM

## 2025-03-14 PROCEDURE — 99397 PER PM REEVAL EST PAT 65+ YR: CPT | Performed by: STUDENT IN AN ORGANIZED HEALTH CARE EDUCATION/TRAINING PROGRAM

## 2025-03-14 PROCEDURE — 90677 PCV20 VACCINE IM: CPT | Performed by: STUDENT IN AN ORGANIZED HEALTH CARE EDUCATION/TRAINING PROGRAM

## 2025-03-14 PROCEDURE — 1160F RVW MEDS BY RX/DR IN RCRD: CPT | Performed by: STUDENT IN AN ORGANIZED HEALTH CARE EDUCATION/TRAINING PROGRAM

## 2025-03-14 PROCEDURE — 3008F BODY MASS INDEX DOCD: CPT | Performed by: STUDENT IN AN ORGANIZED HEALTH CARE EDUCATION/TRAINING PROGRAM

## 2025-03-14 RX ORDER — AMLODIPINE BESYLATE 2.5 MG/1
2.5 TABLET ORAL DAILY
Qty: 90 TABLET | Refills: 3 | Status: SHIPPED | OUTPATIENT
Start: 2025-03-14

## 2025-03-14 ASSESSMENT — ENCOUNTER SYMPTOMS
NEUROLOGICAL NEGATIVE: 1
RESPIRATORY NEGATIVE: 1
CONSTITUTIONAL NEGATIVE: 1
HEMATOLOGIC/LYMPHATIC NEGATIVE: 1
PSYCHIATRIC NEGATIVE: 1
CARDIOVASCULAR NEGATIVE: 1
ALLERGIC/IMMUNOLOGIC NEGATIVE: 1
MUSCULOSKELETAL NEGATIVE: 1
ENDOCRINE NEGATIVE: 1
EYES NEGATIVE: 1
GASTROINTESTINAL NEGATIVE: 1

## 2025-03-14 ASSESSMENT — PATIENT HEALTH QUESTIONNAIRE - PHQ9
1. LITTLE INTEREST OR PLEASURE IN DOING THINGS: NOT AT ALL
SUM OF ALL RESPONSES TO PHQ9 QUESTIONS 1 AND 2: 0
2. FEELING DOWN, DEPRESSED OR HOPELESS: NOT AT ALL

## 2025-03-14 ASSESSMENT — PAIN SCALES - GENERAL: PAINLEVEL_OUTOF10: 0-NO PAIN

## 2025-03-14 NOTE — PATIENT INSTRUCTIONS
- Overall, the patient feels well and denies any acute symptoms / concerns at this time.  - Blood pressure at goal today.  - Encouraged continued dietary, exercise, and lifestyle modification.  - Significant medication and problem list reconciliation done today.     Discussed routine and/or preventative care with the patient as outlined below:  - Labwork:   - Patient had labwork done for this appointment. Discussed today.   - Patient's very mildly abnormal, mostly unchanged compared to last year. Do not need to add medication at this time.  - Remainder of labwork looked great.  - Will order labwork for the patient's next appointment. Encouraged the patient to get this labwork done one week prior to the next appointment.  - Imaging:   - Colorectal Cancer: Patient is not due for this until 2028.  - Mammogram: Patient will be due in the Fall. Ordered today.  - Bone Density Study: Patient will be due in the Fall. Ordered today.  - Encouraged the patient to continue to get her routine cervical cancer screening.  - Immunizations:   - Encouraged the patient to get the pneumonia (prevnar-20) immunization.  - Encouraged the patient to get their shingles (shingrix) immunizations.

## 2025-03-14 NOTE — PROGRESS NOTES
[facility]: [facility]  PHYSICAL EXAM      Betsy Escalante is a 65 y.o. female that is presenting today for Annual Exam.    Assessment/Plan   - Overall, the patient feels well and denies any acute symptoms / concerns at this time.  - Blood pressure at goal today.  - Encouraged continued dietary, exercise, and lifestyle modification.  - Significant medication and problem list reconciliation done today.     Discussed routine and/or preventative care with the patient as outlined below:  - Labwork:   - Patient had labwork done for this appointment. Discussed today.   - Patient's very mildly abnormal, mostly unchanged compared to last year. Do not need to add medication at this time.  - Remainder of labwork looked great.  - Will order labwork for the patient's next appointment. Encouraged the patient to get this labwork done one week prior to the next appointment.  - Imaging:   - Colorectal Cancer: Patient is not due for this until 2028.  - Mammogram: Patient will be due in the Fall. Ordered today.  - Bone Density Study: Patient will be due in the Fall. Ordered today.  - Encouraged the patient to continue to get her routine cervical cancer screening.  - Immunizations:   - Encouraged the patient to get the pneumonia (prevnar-20) immunization.  - Encouraged the patient to get their shingles (shingrix) immunizations.     Diagnoses and all orders for this visit:  Annual physical exam  -     Follow Up In Primary Care  -     Follow Up In Primary Care; Future  Encounter for screening mammogram for breast cancer  -     BI mammo bilateral screening tomosynthesis; Future  Encounter for colorectal cancer screening  Encounter for screening for osteoporosis  -     XR DEXA bone density; Future  Encounter for routine laboratory testing  Hyperglycemia  -     Hemoglobin A1C; Future  Encounter for immunization  -     Pneumococcal conjugate vaccine, 20-valent (PREVNAR 20)  Mixed hyperlipidemia  -     Hepatic  Function Panel; Future  -     Lipid Panel; Future  Primary hypertension  -     CBC and Auto Differential; Future  -     Basic Metabolic Panel; Future  -     TSH with reflex to Free T4 if abnormal; Future  Vitamin D deficiency  -     Vitamin D 25-Hydroxy,Total (for eval of Vitamin D levels); Future  Anxiety  Menopause  -     XR DEXA bone density; Future    Current Outpatient Medications   Medication Instructions    amLODIPine (NORVASC) 2.5 mg, oral, Daily    calcium carbonate-vit D3-min 600 mg calcium- 400 unit tablet 1 tablet, oral, Daily    multivitamin tablet 1 tablet, oral, Daily     Subjective   - The patient otherwise feels well and denies any acute symptoms or concerns at this time.  - The patient denies any changes or progression of their chronic medical problems.  - The patient denies any problems or concerns with their medications.      Review of Systems   Constitutional: Negative.    HENT: Negative.     Eyes: Negative.    Respiratory: Negative.     Cardiovascular: Negative.    Gastrointestinal: Negative.    Endocrine: Negative.    Genitourinary: Negative.    Musculoskeletal: Negative.    Skin: Negative.    Allergic/Immunologic: Negative.    Neurological: Negative.    Hematological: Negative.    Psychiatric/Behavioral: Negative.     All other systems reviewed and are negative.     Objective   Vitals:    03/14/25 1114   BP: 138/88   Pulse: 64   SpO2: 97%     Body mass index is 24.63 kg/m².  Physical Exam  Vitals and nursing note reviewed.   Constitutional:       General: She is not in acute distress.     Appearance: Normal appearance. She is not ill-appearing.   HENT:      Head: Normocephalic and atraumatic.      Right Ear: Tympanic membrane, ear canal and external ear normal. There is no impacted cerumen.      Left Ear: Tympanic membrane, ear canal and external ear normal. There is no impacted cerumen.      Nose: Nose normal.      Mouth/Throat:      Mouth: Mucous membranes are moist.      Pharynx:  Oropharynx is clear. No oropharyngeal exudate or posterior oropharyngeal erythema.   Eyes:      General: No scleral icterus.        Right eye: No discharge.         Left eye: No discharge.      Extraocular Movements: Extraocular movements intact.      Conjunctiva/sclera: Conjunctivae normal.      Pupils: Pupils are equal, round, and reactive to light.   Neck:      Vascular: No carotid bruit.   Cardiovascular:      Rate and Rhythm: Normal rate and regular rhythm.      Pulses: Normal pulses.      Heart sounds: Normal heart sounds. No murmur heard.     No friction rub. No gallop.   Pulmonary:      Effort: Pulmonary effort is normal. No respiratory distress.      Breath sounds: Normal breath sounds.   Abdominal:      General: Abdomen is flat. Bowel sounds are normal. There is no distension.      Palpations: Abdomen is soft.      Tenderness: There is no abdominal tenderness.      Hernia: No hernia is present.   Musculoskeletal:         General: No swelling or tenderness. Normal range of motion.   Lymphadenopathy:      Cervical: No cervical adenopathy.   Skin:     General: Skin is warm and dry.      Capillary Refill: Capillary refill takes less than 2 seconds.      Coloration: Skin is not jaundiced.      Findings: No rash.   Neurological:      General: No focal deficit present.      Mental Status: She is alert and oriented to person, place, and time. Mental status is at baseline.   Psychiatric:         Mood and Affect: Mood normal.         Behavior: Behavior normal.       Diagnostic Results   Lab Results   Component Value Date    GLUCOSE 96 03/10/2025    CALCIUM 9.8 03/10/2025     03/10/2025    K 3.9 03/10/2025    CO2 23 03/10/2025     03/10/2025    BUN 13 03/10/2025    CREATININE 0.70 03/10/2025     Lab Results   Component Value Date    ALT 16 03/10/2025    AST 16 03/10/2025    ALKPHOS 83 03/10/2025    BILITOT 1.0 03/10/2025     Lab Results   Component Value Date    WBC 4.5 03/10/2025    HGB 15.1 03/10/2025  "   HCT 45.8 (H) 03/10/2025    MCV 93.3 03/10/2025     03/10/2025     Lab Results   Component Value Date    CHOL 202 (H) 03/10/2025    CHOL 209 (H) 02/16/2024    CHOL 203 (H) 11/10/2022     Lab Results   Component Value Date    HDL 60 03/10/2025    HDL 59.0 02/16/2024    HDL 53 11/10/2022     Lab Results   Component Value Date    LDLCALC 120 (H) 03/10/2025    LDLCALC 129 02/16/2024    LDLCALC 129 11/10/2022     Lab Results   Component Value Date    TRIG 111 03/10/2025    TRIG 106 02/16/2024    TRIG 103 11/10/2022     No components found for: \"CHOLHDL\"  Lab Results   Component Value Date    HGBA1C 5.4 03/10/2025     Other labs not included in the list above were reviewed either before or during this encounter.    History   Past Medical History:   Diagnosis Date    Age-related nuclear cataract of both eyes     Blepharitis of upper eyelids of both eyes     Dermatochalasis of both upper eyelids     Ocular hypertension, bilateral     Preglaucoma, bilateral      Past Surgical History:   Procedure Laterality Date    BREAST BIOPSY Left      Family History   Problem Relation Name Age of Onset    Other (sepsis) Mother      Heart attack Father      Hypertension Father      Heart disease Father      Diabetes Brother      Diabetes Other FAMILY HX     Heart disease Other FAMILY HX     Other (high blood pressure) Other FAMILY HX      Social History     Socioeconomic History    Marital status:      Spouse name: Not on file    Number of children: Not on file    Years of education: Not on file    Highest education level: Not on file   Occupational History    Not on file   Tobacco Use    Smoking status: Never     Passive exposure: Never    Smokeless tobacco: Never   Vaping Use    Vaping status: Never Used   Substance and Sexual Activity    Alcohol use: Yes    Drug use: Not Currently    Sexual activity: Not on file   Other Topics Concern    Not on file   Social History Narrative    Not on file     Social Drivers of Health "     Financial Resource Strain: Not on file   Food Insecurity: Not on file   Transportation Needs: Not on file   Physical Activity: Not on file   Stress: Not on file   Social Connections: Not on file   Intimate Partner Violence: Not on file   Housing Stability: Not on file     No Known Allergies  Current Outpatient Medications on File Prior to Visit   Medication Sig Dispense Refill    amLODIPine (Norvasc) 2.5 mg tablet TAKE 1 TABLET BY MOUTH EVERY DAY 90 tablet 3    calcium carbonate-vit D3-min 600 mg calcium- 400 unit tablet Take 1 tablet by mouth once daily.      multivitamin tablet Take 1 tablet by mouth once daily.       No current facility-administered medications on file prior to visit.     Immunization History   Administered Date(s) Administered    Hepatitis B vaccine, 19 yrs and under (RECOMBIVAX, ENGERIX) 08/13/2009, 09/14/2009    Moderna SARS-CoV-2 Vaccination 04/07/2021, 05/07/2021    Tdap vaccine, age 7 year and older (BOOSTRIX, ADACEL) 08/13/2009, 10/12/2018     Patient's medical history was reviewed and updated either before or during this encounter.       Evens Mcclelland MD

## 2025-03-30 ENCOUNTER — OFFICE VISIT (OUTPATIENT)
Dept: URGENT CARE | Age: 65
End: 2025-03-30
Payer: COMMERCIAL

## 2025-03-30 VITALS
RESPIRATION RATE: 18 BRPM | TEMPERATURE: 98.5 F | OXYGEN SATURATION: 100 % | HEIGHT: 65 IN | BODY MASS INDEX: 24.66 KG/M2 | SYSTOLIC BLOOD PRESSURE: 162 MMHG | HEART RATE: 89 BPM | WEIGHT: 148 LBS | DIASTOLIC BLOOD PRESSURE: 89 MMHG

## 2025-03-30 DIAGNOSIS — N30.01 ACUTE CYSTITIS WITH HEMATURIA: ICD-10-CM

## 2025-03-30 LAB
POC APPEARANCE, URINE: ABNORMAL
POC BILIRUBIN, URINE: NEGATIVE
POC BLOOD, URINE: ABNORMAL
POC COLOR, URINE: YELLOW
POC GLUCOSE, URINE: NEGATIVE MG/DL
POC KETONES, URINE: NEGATIVE MG/DL
POC LEUKOCYTES, URINE: ABNORMAL
POC NITRITE,URINE: NEGATIVE
POC PH, URINE: 6 PH
POC PROTEIN, URINE: NEGATIVE MG/DL
POC SPECIFIC GRAVITY, URINE: 1.01
POC UROBILINOGEN, URINE: 0.2 EU/DL

## 2025-03-30 PROCEDURE — 1159F MED LIST DOCD IN RCRD: CPT | Performed by: SURGERY

## 2025-03-30 PROCEDURE — 1160F RVW MEDS BY RX/DR IN RCRD: CPT | Performed by: SURGERY

## 2025-03-30 PROCEDURE — 3008F BODY MASS INDEX DOCD: CPT | Performed by: SURGERY

## 2025-03-30 PROCEDURE — 1036F TOBACCO NON-USER: CPT | Performed by: SURGERY

## 2025-03-30 PROCEDURE — 99214 OFFICE O/P EST MOD 30 MIN: CPT | Performed by: SURGERY

## 2025-03-30 PROCEDURE — 81003 URINALYSIS AUTO W/O SCOPE: CPT | Performed by: SURGERY

## 2025-03-30 PROCEDURE — 3077F SYST BP >= 140 MM HG: CPT | Performed by: SURGERY

## 2025-03-30 PROCEDURE — 3079F DIAST BP 80-89 MM HG: CPT | Performed by: SURGERY

## 2025-03-30 RX ORDER — SULFAMETHOXAZOLE AND TRIMETHOPRIM 800; 160 MG/1; MG/1
1 TABLET ORAL 2 TIMES DAILY
Qty: 10 TABLET | Refills: 0 | Status: SHIPPED | OUTPATIENT
Start: 2025-03-30 | End: 2025-04-04

## 2025-03-30 NOTE — PROGRESS NOTES
Chief Complaint   Patient presents with    UTI     UTI / FREQUENCY / URGENCY/ PRESSURE / SINCE FRIDAY        Physical Exam:     Abdomen is soft, non-tender, and non-distended. Mild suprapubic tenderness. No CVA tenderness.     POC Labs:     Office Visit on 03/30/2025   Component Date Value Ref Range Status    POC Color, Urine 03/30/2025 Yellow  Straw, Yellow, Light-Yellow Final    POC Appearance, Urine 03/30/2025 Cloudy (A)  Clear Final    POC Glucose, Urine 03/30/2025 NEGATIVE  NEGATIVE mg/dl Final    POC Bilirubin, Urine 03/30/2025 NEGATIVE  NEGATIVE Final    POC Ketones, Urine 03/30/2025 NEGATIVE  NEGATIVE mg/dl Final    POC Specific Gravity, Urine 03/30/2025 1.010  1.005 - 1.035 Final    POC Blood, Urine 03/30/2025 SMALL (1+) (A)  NEGATIVE Final    POC PH, Urine 03/30/2025 6.0  No Reference Range Established PH Final    POC Protein, Urine 03/30/2025 NEGATIVE  NEGATIVE mg/dl Final    POC Urobilinogen, Urine 03/30/2025 0.2  0.2, 1.0 EU/DL Final    Poc Nitrite, Urine 03/30/2025 NEGATIVE  NEGATIVE Final    POC Leukocytes, Urine 03/30/2025 LARGE (3+) (A)  NEGATIVE Final            Encounter Diagnosis   Name Primary?    Acute cystitis with hematuria             Medical Decision Making & Plan:       Bactrim  Ucx    Home      03/30/25 at 1:00 PM - Iesha Damon DO

## 2025-04-01 LAB — BACTERIA UR CULT: ABNORMAL

## 2025-04-03 DIAGNOSIS — N30.00 ACUTE CYSTITIS WITHOUT HEMATURIA: Primary | ICD-10-CM

## 2025-04-03 RX ORDER — NITROFURANTOIN 25; 75 MG/1; MG/1
100 CAPSULE ORAL 2 TIMES DAILY
Qty: 14 CAPSULE | Refills: 0 | Status: SHIPPED | OUTPATIENT
Start: 2025-04-03 | End: 2025-04-10

## 2025-04-03 NOTE — PROGRESS NOTES
Ucx return with resistance to Bactrim. Will send over Macrobid to pharmacy       04/03/25 at 11:01 AM - Iesha Damon DO

## 2025-07-18 PROCEDURE — 88175 CYTOPATH C/V AUTO FLUID REDO: CPT

## 2025-07-21 ENCOUNTER — LAB REQUISITION (OUTPATIENT)
Dept: LAB | Facility: HOSPITAL | Age: 65
End: 2025-07-21
Payer: COMMERCIAL

## 2025-07-21 DIAGNOSIS — Z11.51 ENCOUNTER FOR SCREENING FOR HUMAN PAPILLOMAVIRUS (HPV): ICD-10-CM

## 2025-07-21 DIAGNOSIS — Z01.419 ENCOUNTER FOR GYNECOLOGICAL EXAMINATION (GENERAL) (ROUTINE) WITHOUT ABNORMAL FINDINGS: ICD-10-CM

## 2025-08-05 LAB
CYTOLOGY CMNT CVX/VAG CYTO-IMP: NORMAL
LAB AP HPV GENOTYPE QUESTION: YES
LAB AP HPV HR: NORMAL
LABORATORY COMMENT REPORT: NORMAL
PATH REPORT.TOTAL CANCER: NORMAL

## 2025-08-14 ENCOUNTER — APPOINTMENT (OUTPATIENT)
Dept: OBSTETRICS AND GYNECOLOGY | Facility: CLINIC | Age: 65
End: 2025-08-14
Payer: COMMERCIAL